# Patient Record
Sex: FEMALE | Race: WHITE
[De-identification: names, ages, dates, MRNs, and addresses within clinical notes are randomized per-mention and may not be internally consistent; named-entity substitution may affect disease eponyms.]

---

## 2018-09-20 ENCOUNTER — HOSPITAL ENCOUNTER (EMERGENCY)
Dept: HOSPITAL 74 - JER | Age: 43
Discharge: HOME | End: 2018-09-20
Payer: COMMERCIAL

## 2018-09-20 VITALS — SYSTOLIC BLOOD PRESSURE: 150 MMHG | TEMPERATURE: 98.6 F | DIASTOLIC BLOOD PRESSURE: 92 MMHG | HEART RATE: 90 BPM

## 2018-09-20 VITALS — BODY MASS INDEX: 25.8 KG/M2
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
## 2018-09-20 DIAGNOSIS — I10: ICD-10-CM
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
## 2018-09-20 DIAGNOSIS — S49.91XA: Primary | ICD-10-CM

## 2018-09-20 PROCEDURE — 3E0333Z INTRODUCTION OF ANTI-INFLAMMATORY INTO PERIPHERAL VEIN, PERCUTANEOUS APPROACH: ICD-10-PCS

## 2018-09-20 NOTE — PDOC
History of Present Illness





<Ila Bailon - Last Filed: 09/20/18 10:38>





- General


History Source: Patient


Exam Limitations: No Limitations





- History of Present Illness


Initial Comments: 





09/20/18 09:35


CHIEF COMPLAINT: Shoulder pain





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 On Saturday, she reports that "50 

pounds" of trays fell onto her right shoulder. She has had severe pain and 

limited range of motion since then, although she has continued to work. Her 

pain is unrelieved by Advil, Tylenol, and ice.





Vital signs on arrival are notable for HTN: /92.





PCP is Dr. Diaz.





REVIEW OF SYSTEMS:


GENERAL/CONSTITUTIONAL: No fever or chills. No weakness. No weight change.


MUSCULOSKELETAL: See HPI.


SKIN: No rash or easy bruising.


NEUROLOGIC: No headache, vertigo, loss of consciousness, or loss of sensation.


HEMATOLOGIC/LYMPHATIC: No anemia, easy bleeding, or history of blood clots.


ALLERGIC/IMMUNOLOGIC: No hives or skin allergy. No latex allergy.





PHYSICAL EXAM:


GENERAL: The patient is awake, alert, and fully oriented, in no acute distress.


LUNGS: Clear to auscultation bilaterally. Normal excursion. No respiratory 

distress or use of accessory muscles.


CV: RRR, S1/S2, no MRG. Cap refill < 2 sec.


ABDOMEN: Soft, non-distended, non-tender.


EXTREMITIES: Holding right arm. Pain with elevation or abduction of right arm 

limiting ROM. Right scapular tenderness.


NEUROLOGICAL: Normal speech, normal gait. CN II-XII grossly intact.


PSYCH: Normal mood, normal affect.


SKIN: Warm, dry, normal turgor, no rashes or lesions noted.








<Consuelo Tanner - Last Filed: 09/20/18 10:47>





- General


Chief Complaint: Pain, Acute


Stated Complaint: RIGHT ARM PAIN


Time Seen by Provider: 09/20/18 07:22





Past History





<Ila Bailon - Last Filed: 09/20/18 10:38>





- Past Medical History


Anemia: No


Asthma: No


Cancer: No


Cardiac Disorders: No


CVA: No


COPD: No


CHF: No


Dementia: No


Diabetes: No


GI Disorders: No


 Disorders: No


HTN: No


Hypercholesterolemia: No


Kidney Stones: Yes


Liver Disease: No


Seizures: No


Thyroid Disease: No





- Surgical History


Abdominal Surgery: No


Appendectomy: No


Cardiac Surgery: No


Cholecystectomy: Yes


Lung Surgery: No


Neurologic Surgery: No


Orthopedic Surgery: No





- Immunization History


Immunization Up to Date: Yes





- Suicide/Smoking/Psychosocial Hx


Smoking Status: Yes


Smoking History: Current every day smoker


Have you smoked in the past 12 months: Yes


Number of Cigarettes Smoked Daily: 3


Information on smoking cessation initiated: No


'Breaking Loose' booklet given: 06/16/14


Hx Alcohol Use: No


Drug/Substance Use Hx: No


Substance Use Type: None


Hx Substance Use Treatment: No





<Consuelo Tanner - Last Filed: 09/20/18 10:47>





- Past Medical History


Allergies/Adverse Reactions: 


 Allergies











Allergy/AdvReac Type Severity Reaction Status Date / Time


 


No Known Drug Allergies Allergy   Verified 09/20/18 06:49











Home Medications: 


Ambulatory Orders





Tramadol HCl/Acetaminophen [Ultracet Tablet] 1 each PO Q6H PRN #20 tablet MDD 4 

09/20/18 











*Physical Exam





- Vital Signs


 Last Vital Signs











Temp Pulse Resp BP Pulse Ox


 


 98.6 F   90   20   150/92   99 


 


 09/20/18 06:25  09/20/18 06:25  09/20/18 06:25  09/20/18 06:25  09/20/18 06:25














<Ila Bailon - Last Filed: 09/20/18 10:38>





- Vital Signs


 Last Vital Signs











Temp Pulse Resp BP Pulse Ox


 


 98.6 F   90   20   150/92   99 


 


 09/20/18 06:25  09/20/18 06:25  09/20/18 06:25  09/20/18 06:25  09/20/18 06:25














<Consuelo Tanner - Last Filed: 09/20/18 10:47>





ED Treatment Course





- Medications


Given in the ED: 


ED Medications














Discontinued Medications














Generic Name Dose Route Start Last Admin





  Trade Name Freq  PRN Reason Stop Dose Admin


 


Ketorolac Tromethamine  30 mg  09/20/18 08:18  09/20/18 08:26





  Toradol Injection -  IM  09/20/18 08:19  30 mg





  ONCE ONE   Administration





     





     





     





     


 


Oxycodone/Acetaminophen  1 combo  09/20/18 10:00  09/20/18 10:03





  Percocet 5/325 -  PO  09/20/18 10:01  1 combo





  ONCE ONE   Administration





     





     





     





     














<Ila Bailon - Last Filed: 09/20/18 10:38>





Medical Decision Making





- Medical Decision Making


The patient was seen and evaluated in conjunction with midlevel provider under 

my direct supervision, ancillary studies were reviewed.  I agree with the plan 

as outlined by NP Flores


43 YOF with right shoulder pain, s/p trays falling against her shoulder


NVI. vitals with  mild HTN, otherwise f/u primary doctor as it needs recheck


XR shoulder with normal alignment, AC joint intact, no clavicle or prox humerus 

fx


contusion/sprain likely, sling for comfort, ROM exercises. OTC pain control as 

needed.


encourage motion and phys activity. 


DC in stable condition. PCP followup 





09/20/18 10:38








<AdamarisAmanIla Preetabdirahman - Last Filed: 09/20/18 10:38>





- Medical Decision Making





09/20/18 09:45


A/P: 43-year-old female with right shoulder injury.





-Xray shoulder 3 views r/o fx, dislocation


-Toradol 30mg IM for pain





Patient reports pain "tolerable" after Toradol.





<Consuelo Tanner - Last Filed: 09/20/18 10:47>





*DC/Admit/Observation/Transfer





<BailonAmanIlakaylen Pendleton - Last Filed: 09/20/18 10:38>





- Discharge Dispostion


Decision to Admit order: No





<Consuelo Tanner - Last Filed: 09/20/18 10:47>


Diagnosis at time of Disposition: 


Shoulder injury


Qualifiers:


 Encounter type: initial encounter Laterality: right Qualified Code(s): 

S49.91XA - Unspecified injury of right shoulder and upper arm, initial encounter








- Discharge Dispostion


Disposition: HOME


Condition at time of disposition: Stable





- Prescriptions


Prescriptions: 


Tramadol HCl/Acetaminophen [Ultracet Tablet] 1 each PO Q6H PRN #20 tablet MDD 4


 PRN Reason: Severe Pain





- Referrals


Referrals: 


Priya Dawson MD [Primary Care Provider] - 


Sean Aragon MD [Staff Physician] - 14 days (Orthopedics - if not improving)





- Patient Instructions


Printed Discharge Instructions:  How to Use a Sling, DI for Shoulder Sprain


Additional Instructions: 


-Use the sling provided, but be sure to take your arm out of the sling and 

stretch gently every day to prevent frozen shoulder


-Take ibuprofen 600mg (3 tablets) every 6 hours with food for no more than one 

week


-Take Tramadol as prescribed for severe breakthrough pain


-Follow up with orthopedics if not improving (referral enclosed)


-Follow up with your primary care doctor to re-check your blood pressure, which 

was elevated today


-Return here for uncontrolled pain or any other concerning symptoms





- Post Discharge Activity


Forms/Work/School Notes:  Back to Work

## 2019-10-14 ENCOUNTER — HOSPITAL ENCOUNTER (EMERGENCY)
Dept: HOSPITAL 74 - JER | Age: 44
LOS: 1 days | Discharge: HOME | End: 2019-10-15
Payer: COMMERCIAL

## 2019-10-14 VITALS — BODY MASS INDEX: 29.8 KG/M2

## 2019-10-14 VITALS — TEMPERATURE: 98.2 F

## 2019-10-14 DIAGNOSIS — J98.01: Primary | ICD-10-CM

## 2019-10-14 DIAGNOSIS — Z90.49: ICD-10-CM

## 2019-10-14 DIAGNOSIS — F41.9: ICD-10-CM

## 2019-10-14 DIAGNOSIS — Z90.79: ICD-10-CM

## 2019-10-14 DIAGNOSIS — J06.9: ICD-10-CM

## 2019-10-14 DIAGNOSIS — Z87.442: ICD-10-CM

## 2019-10-14 DIAGNOSIS — Z88.6: ICD-10-CM

## 2019-10-14 LAB
BASOPHILS # BLD: 0.5 % (ref 0–2)
DEPRECATED RDW RBC AUTO: 13 % (ref 11.6–15.6)
EOSINOPHIL # BLD: 3.9 % (ref 0–4.5)
HCT VFR BLD CALC: 45.1 % (ref 32.4–45.2)
HGB BLD-MCNC: 15.6 GM/DL (ref 10.7–15.3)
INR BLD: 0.95 (ref 0.83–1.09)
LYMPHOCYTES # BLD: 31.2 % (ref 8–40)
MCH RBC QN AUTO: 32.1 PG (ref 25.7–33.7)
MCHC RBC AUTO-ENTMCNC: 34.5 G/DL (ref 32–36)
MCV RBC: 93 FL (ref 80–96)
MONOCYTES # BLD AUTO: 6.2 % (ref 3.8–10.2)
NEUTROPHILS # BLD: 58.2 % (ref 42.8–82.8)
PLATELET # BLD AUTO: 351 K/MM3 (ref 134–434)
PMV BLD: 8.1 FL (ref 7.5–11.1)
PT PNL PPP: 11.2 SEC (ref 9.7–13)
RBC # BLD AUTO: 4.85 M/MM3 (ref 3.6–5.2)
WBC # BLD AUTO: 10.1 K/MM3 (ref 4–10)

## 2019-10-14 PROCEDURE — 3E0F7GC INTRODUCTION OF OTHER THERAPEUTIC SUBSTANCE INTO RESPIRATORY TRACT, VIA NATURAL OR ARTIFICIAL OPENING: ICD-10-PCS

## 2019-10-14 PROCEDURE — 3E033NZ INTRODUCTION OF ANALGESICS, HYPNOTICS, SEDATIVES INTO PERIPHERAL VEIN, PERCUTANEOUS APPROACH: ICD-10-PCS

## 2019-10-14 NOTE — PDOC
History of Present Illness





- General


Chief Complaint: Shortness of Breath


Stated Complaint: COUGH/SOB


Time Seen by Provider: 10/14/19 23:16





- History of Present Illness


Initial Comments: 


10/14/19 23:48


45 yo F PMH hysterectomy, anxiety, cholecystectomy, p/w CP and SOB. States that 

she received the flu shot about a week ago, and then was bedbound for 4 days 

with generalized malaise, sinus congestion, and non-productive cough. She was 

out eating dinner and then dancing with her daughter when she developed R sided 

chest pain, similar in quality to intermittent CP she had been experiencing for 

months but much more intense, "feels like my ribs are broken and it is stabbing 

my lung", associated with severe cough and nausea without vomiting. Patient 

also complains that her lower extremities have been slightly swollen over the 

last week, R worse than L, and has also been having numbness and tingling in 

her toes.





She presents pulse ox of 96% on room air, tachycardic at 125 bpm, /86.








Past History





- Past Medical History


Allergies/Adverse Reactions: 


 Allergies











Allergy/AdvReac Type Severity Reaction Status Date / Time


 


aspirin Allergy Severe Difficulty Verified 10/15/19 00:54





   Breathing  











Home Medications: 


Ambulatory Orders





Tramadol HCl/Acetaminophen [Ultracet Tablet] 1 each PO Q6H PRN #20 tablet MDD 4 

09/20/18 








Anemia: No


Asthma: No


Cancer: No


Cardiac Disorders: No


CVA: No


COPD: No


CHF: No


Dementia: No


Diabetes: No


GI Disorders: No


 Disorders: No


HTN: No


Hypercholesterolemia: No


Kidney Stones: Yes


Liver Disease: No


Seizures: No


Thyroid Disease: No





- Surgical History


Abdominal Surgery: No


Appendectomy: No


Cardiac Surgery: No


Cholecystectomy: Yes


Lung Surgery: No


Neurologic Surgery: No


Orthopedic Surgery: No





- Immunization History


Immunization Up to Date: Yes





- Psycho Social/Smoking Cessation Hx


Smoking Status: Yes


Smoking History: Current every day smoker


Have you smoked in the past 12 months: Yes


Number of Cigarettes Smoked Daily: 3


Information on smoking cessation initiated: No


'Breaking Loose' booklet given: 06/16/14


Hx Alcohol Use: Yes


Drug/Substance Use Hx: No


Substance Use Type: None


Hx Substance Use Treatment: No





**Review of Systems





- Review of Systems


Constitutional: Yes: Malaise.  No: Chills, Diaphoresis, Fever


HEENTM: Yes: Nose Congestion.  No: Recent change in vision, Double Vision, 

Throat Pain, Difficulty Swallowing


Respiratory: Yes: Cough, Shortness of Breath.  No: Wheezing


Cardiac (ROS): Yes: Chest Pain (R sided).  No: Edema, Irregular Heart Rate


ABD/GI: Yes: Nausea.  No: Constipated, Diarrhea, Vomiting


: No: Burning, Dysuria, Discharge, Frequency, Flank Pain


Musculoskeletal: No: Back Pain, Muscle Weakness


Neurological: No: Headache, Numbness, Tingling





*Physical Exam





- Vital Signs


 Last Vital Signs











Temp Pulse Resp BP Pulse Ox


 


 98.2 F   132 H  29 H  218/188 H  100 


 


 10/14/19 23:02  10/14/19 23:02  10/14/19 23:02  10/14/19 23:02  10/14/19 23:02














- Physical Exam


Comments: 


10/15/19 00:51


Gen: well-developed, well-nourished, appears distressed, coughing extremely hard


Neuro: AAOX4, CN II-XII intact, FTN intact, EOMI, PERRLA


HEENT: atraumatic, normocephalic, dry mucous membranes


Neck: trachea midline, supple


CV: tachycardic, regular rhythm, no murmurs, rubs, or gallops


Pulm: tachypneic, CTA b/l, no wheezing


Abd: soft, non-distended, non-tender


MSK: full ROM, intact pulses


Extr: no edema, no deformities


Skin: warm, dry








ED Treatment Course





- LABORATORY


CBC & Chemistry Diagram: 


 10/14/19 23:30





 10/14/19 23:30





- ADDITIONAL ORDERS


Additional order review: 


 











  10/14/19





  23:30


 


RBC  4.85


 


MCV  93.0


 


MCHC  34.5


 


RDW  13.0


 


MPV  8.1


 


Neutrophils %  58.2


 


Lymphocytes %  31.2  D


 


Monocytes %  6.2


 


Eosinophils %  3.9


 


Basophils %  0.5














Medical Decision Making





- Medical Decision Making


10/15/19 00:47


Concern for potential PE vs anxiety attack.


- CBC, CMP, mag


- CXR port, trop


- EKG sinus tachycardia at 118bpm


- UA/UC


- BNP


- coags


- lipase


- CTA chest for r/o PE


- reassess





10/15/19 02:11


CTA negative for PE. Will give 0.5 mg Xanax, reassess.





10/15/19 02:51


Patient much improved, likely had a panic attack. Discussed getting in touch 

with her primary care doctor and a psychiatrist as soon as possible. Will dc 

home.








Discharge





- Discharge Information


Problems reviewed: Yes


Clinical Impression/Diagnosis: 


 Anxiety





Condition: Improved


Disposition: HOME





- Follow up/Referral


Referrals: 


Wei Diaz MD [Primary Care Provider] - 





- Patient Discharge Instructions


Patient Printed Discharge Instructions:  DI for Anxiety -- Adult


Additional Instructions: 


You were seen with chest pain and shortness of breath. Your EKG and labs were 

unremarkable, and your CTA of your chest did not show a clot in your lungs. 

Your symptoms responded after receiving Xanax. You most likely had a panic 

attack. It is very important that you follow up with your primary care 

physician. Make an appointment within 1 week, and make sure they can get you in 

touch with a psychiatrist. Return to the ED if you develop worsening symptoms.





- Post Discharge Activity


Work/Back to School Note:  Back to Work

## 2019-10-15 ENCOUNTER — HOSPITAL ENCOUNTER (EMERGENCY)
Dept: HOSPITAL 74 - JER | Age: 44
Discharge: HOME | End: 2019-10-15
Payer: COMMERCIAL

## 2019-10-15 VITALS — HEART RATE: 96 BPM | SYSTOLIC BLOOD PRESSURE: 131 MMHG | DIASTOLIC BLOOD PRESSURE: 84 MMHG

## 2019-10-15 VITALS — SYSTOLIC BLOOD PRESSURE: 107 MMHG | HEART RATE: 78 BPM | DIASTOLIC BLOOD PRESSURE: 72 MMHG

## 2019-10-15 VITALS — TEMPERATURE: 98.7 F

## 2019-10-15 VITALS — BODY MASS INDEX: 27.4 KG/M2

## 2019-10-15 DIAGNOSIS — Z88.6: ICD-10-CM

## 2019-10-15 DIAGNOSIS — R07.89: Primary | ICD-10-CM

## 2019-10-15 DIAGNOSIS — Z90.79: ICD-10-CM

## 2019-10-15 DIAGNOSIS — F41.9: ICD-10-CM

## 2019-10-15 DIAGNOSIS — Z90.49: ICD-10-CM

## 2019-10-15 LAB
ALBUMIN SERPL-MCNC: 3.7 G/DL (ref 3.4–5)
ALBUMIN SERPL-MCNC: 4 G/DL (ref 3.4–5)
ALP SERPL-CCNC: 74 U/L (ref 45–117)
ALP SERPL-CCNC: 88 U/L (ref 45–117)
ALT SERPL-CCNC: 16 U/L (ref 13–61)
ALT SERPL-CCNC: 17 U/L (ref 13–61)
ANION GAP SERPL CALC-SCNC: 3 MMOL/L (ref 8–16)
ANION GAP SERPL CALC-SCNC: 9 MMOL/L (ref 8–16)
APPEARANCE UR: CLEAR
AST SERPL-CCNC: 27 U/L (ref 15–37)
AST SERPL-CCNC: 40 U/L (ref 15–37)
BASOPHILS # BLD: 1.2 % (ref 0–2)
BILIRUB SERPL-MCNC: 0.3 MG/DL (ref 0.2–1)
BILIRUB SERPL-MCNC: 0.6 MG/DL (ref 0.2–1)
BILIRUB UR STRIP.AUTO-MCNC: NEGATIVE MG/DL
BNP SERPL-MCNC: 26.2 PG/ML (ref 5–125)
BUN SERPL-MCNC: 10.4 MG/DL (ref 7–18)
BUN SERPL-MCNC: 13.2 MG/DL (ref 7–18)
CALCIUM SERPL-MCNC: 8.5 MG/DL (ref 8.5–10.1)
CALCIUM SERPL-MCNC: 8.8 MG/DL (ref 8.5–10.1)
CHLORIDE SERPL-SCNC: 108 MMOL/L (ref 98–107)
CHLORIDE SERPL-SCNC: 109 MMOL/L (ref 98–107)
CO2 SERPL-SCNC: 23 MMOL/L (ref 21–32)
CO2 SERPL-SCNC: 26 MMOL/L (ref 21–32)
COLOR UR: YELLOW
CREAT SERPL-MCNC: 0.6 MG/DL (ref 0.55–1.3)
CREAT SERPL-MCNC: 0.8 MG/DL (ref 0.55–1.3)
DEPRECATED RDW RBC AUTO: 13.2 % (ref 11.6–15.6)
EOSINOPHIL # BLD: 2.8 % (ref 0–4.5)
GLUCOSE SERPL-MCNC: 102 MG/DL (ref 74–106)
GLUCOSE SERPL-MCNC: 81 MG/DL (ref 74–106)
HCT VFR BLD CALC: 43.4 % (ref 32.4–45.2)
HGB BLD-MCNC: 14.8 GM/DL (ref 10.7–15.3)
INR BLD: 1.08 (ref 0.83–1.09)
KETONES UR QL STRIP: NEGATIVE
LEUKOCYTE ESTERASE UR QL STRIP.AUTO: NEGATIVE
LIPASE SERPL-CCNC: 164 U/L (ref 73–393)
LYMPHOCYTES # BLD: 18.6 % (ref 8–40)
MAGNESIUM SERPL-MCNC: 2 MG/DL (ref 1.8–2.4)
MAGNESIUM SERPL-MCNC: 2 MG/DL (ref 1.8–2.4)
MCH RBC QN AUTO: 31.6 PG (ref 25.7–33.7)
MCHC RBC AUTO-ENTMCNC: 34.1 G/DL (ref 32–36)
MCV RBC: 92.9 FL (ref 80–96)
MONOCYTES # BLD AUTO: 6.3 % (ref 3.8–10.2)
NEUTROPHILS # BLD: 71.1 % (ref 42.8–82.8)
NITRITE UR QL STRIP: NEGATIVE
PH UR: 6 [PH] (ref 5–8)
PLATELET # BLD AUTO: 302 K/MM3 (ref 134–434)
PMV BLD: 8.1 FL (ref 7.5–11.1)
POTASSIUM SERPLBLD-SCNC: 4.4 MMOL/L (ref 3.5–5.1)
POTASSIUM SERPLBLD-SCNC: 5 MMOL/L (ref 3.5–5.1)
PROT SERPL-MCNC: 6.7 G/DL (ref 6.4–8.2)
PROT SERPL-MCNC: 7.3 G/DL (ref 6.4–8.2)
PROT UR QL STRIP: NEGATIVE
PROT UR QL STRIP: NEGATIVE
PT PNL PPP: 12.7 SEC (ref 9.7–13)
RBC # BLD AUTO: 4.67 M/MM3 (ref 3.6–5.2)
SODIUM SERPL-SCNC: 138 MMOL/L (ref 136–145)
SODIUM SERPL-SCNC: 140 MMOL/L (ref 136–145)
SP GR UR: 1 (ref 1.01–1.03)
UROBILINOGEN UR STRIP-MCNC: 0.2 MG/DL (ref 0.2–1)
WBC # BLD AUTO: 12.3 K/MM3 (ref 4–10)

## 2019-10-15 PROCEDURE — 3E0337Z INTRODUCTION OF ELECTROLYTIC AND WATER BALANCE SUBSTANCE INTO PERIPHERAL VEIN, PERCUTANEOUS APPROACH: ICD-10-PCS

## 2019-10-15 PROCEDURE — 3E0333Z INTRODUCTION OF ANTI-INFLAMMATORY INTO PERIPHERAL VEIN, PERCUTANEOUS APPROACH: ICD-10-PCS

## 2019-10-15 NOTE — EKG
Test Reason : 

Blood Pressure : ***/*** mmHG

Vent. Rate : 113 BPM     Atrial Rate : 113 BPM

   P-R Int : 148 ms          QRS Dur : 074 ms

    QT Int : 328 ms       P-R-T Axes : 067 050 054 degrees

   QTc Int : 449 ms

 

*** POOR DATA QUALITY, INTERPRETATION MAY BE ADVERSELY AFFECTED

SINUS TACHYCARDIA

POSSIBLE LEFT ATRIAL ENLARGEMENT

BORDERLINE ECG

WHEN COMPARED WITH ECG OF 16-JUN-2014 04:58,

VENT. RATE HAS INCREASED BY  58 BPM

Confirmed by Eliud Pride MD ([id number]) on 10/15/2019 12:51:27 PM

 

Referred By:             Confirmed By:Eliud Pride MD

## 2019-10-15 NOTE — PDOC
Attending Attestation





- Resident


Resident Name: JettmichelleDanilo





- ED Attending Attestation


I have performed the following: I have examined & evaluated the patient, The 

case was reviewed & discussed with the resident, I agree w/resident's findings 

& plan, Exceptions are as noted





- HPI


HPI: 





10/15/19 10:10


45yo F hx cholecystectomy, hysterectomy, anxiety p/w 2 weeks of right lower 

chest pain, worse with movement, inspiration, palpation. Pain has been much 

worse since last night after she was dancing with her daughter. Pt was seen 

here for the same, had a PE w/u including CTA which was negative. Pt reports 

the sxs started in the setting of a cough that began 1.5 weeks ago. Denies 

associated dizziness, diaphoresis, weakness/numbness, N/V, SOB, abd pain, LE 

edema. She has been taking advil at home with minimal relief. Pt returned to 

the ED this morning as she was unable to sleep 2/2 pain. Was given xanax and 

morphine prior to discharge last night. Denies other trauma.





- Physicial Exam


PE: 





10/15/19 10:40


GENERAL: Awake, alert, and fully oriented, in no acute distress


EYES: PERRLA, EOMI, sclera anicteric, conjunctiva clear


ENT: Oropharynx clear without exudates. Moist mucosa


NECK: Normal ROM, supple, no lymphadenopathy, JVD, or masses


LUNGS: Breath sounds equal, clear to auscultation bilaterally.  No wheezes, and 

no crackles


HEART: Regular rate and rhythm, normal S1 and S2, no murmurs, rubs or gallops. +

ttp along 9th/10th intercostal muscles. No masses palpated around or on breast.


ABDOMEN: Soft, nontender, normoactive bowel sounds.  No guarding, no rebound.  

No masses


EXTREMITIES: Normal range of motion, no edema. No cords, erythema, or tenderness


NEUROLOGICAL:  Normal speech, cranial nerves intact, equal stregnth and 

sensation b/l


SKIN: Warm, Dry, normal turgor, no rashes or lesions noted.





- Medical Decision Making





10/15/19 10:44


45yo F presents to the ED for 2nd time in 1 day for reproducible, positional 

rib pain, started in setting of coughing 2 weeks ago, worse after dancing last 

night. 


Highly consistent with musculoskeletal pain, rib strain, especially with 

negative CTA and trop yesterday


EKG with no significant changes


WIll rpt trop, CXR, and treat pain with toradol. Pt states allergy to ASA is 

throat closing but has been taking advil almost daily





10/15/19 12:26


Labs wnl


Patient is feeling significantly better after Toradol, was able to sleep for 2 

hours in the emergency department.  She is eager to go home.  Will prescribe 

naproxen for pain control.  Discussed with patient the importance of not taking 

Advil, Motrin, ibuprofen or any other NSAID medications while she is taking 

naproxen.  She is clinically stable for discharge home.





I discussed the physical exam findings, ancillary test results and final 

diagnoses with the patient. I answered all of the patient's questions. The 

patient was satisfied with the care received and felt comfortable with the 

discharge plan and treatment plan. The patient will call their primary care 

physician within 24 hours to arrange follow-up and will return to the Emergency 

Department with any new, persistent or worsening symptoms. 





**Heart Score/ECG Review


  ** #1





10/15/19 10:46


Twelve-lead EKG was performed and reviewed by me.  Normal sinus rhythm, rate 

77.  Normal axis and intervals.  No ST elevations.  T wave inversions in 3 and 

V3.  When compared to EKG done last night T wave inversion in V3 it is, however 

this could be due to lead placement.  In isolation is not concerning.

## 2019-10-15 NOTE — PDOC
Documentation entered by Haley Ortega SCRIBE, acting as scribe for Giselle Carrasco MD.








Giselle Carrasco MD:  This documentation has been prepared by the harrisibe, 

Haley Ortega SCRIBE, under my direction and personally reviewed by me in its 

entirety.  I confirm that the documentation accurately reflects all work, 

treatment, procedures, and medical decision making performed by me.  





Attending Attestation





- Resident


Resident Name: Irene Moreno





- ED Attending Attestation


I have performed the following: I have examined & evaluated the patient, The 

case was reviewed & discussed with the resident, I agree w/resident's findings 

& plan, Exceptions are as noted





- HPI


HPI: 





10/14/19 23:21


44-year-old female presents with chest pain and shortness of breath.  She says 

she got the flu shot and then was bedbound for 4 days with cough and shortness 

of breath.  She presents pulse ox of 96% on room air, tachycardic at 125 bpm


She also states that her lower extremities have been slightly swollen over the 

last week





- Physicial Exam


PE: 





10/14/19 23:37


44-year-old female presents with tachycardia and cough


Head normocephalic atraumatic


Neck supple, no JVD no bruits


Lungs clear to auscultation bilaterally


CVS sinus tachycardia


Abdomen protuberant, nontender


Skin warm and dry


Extremities no pitting edema


Neuro alert and oriented x3 patient is ambulating to the bathroom currently





- Medical Decision Making





10/15/19 01:02


43 yo female  who reports 5-6 days of cough and shortness of breath but she is 

not hypoxic





ekg sinus tachycardia 


10/15/19 02:13


Repeat blood pressure was 125/86


CT of the chest was negative for pulmonary embolism negative for any infiltrates

, the lungs are clear





Her troponin was negative, her CBC was unremarkable


Chemistries show normal renal function, normal electrolytes and normal LFTs


10/15/19 02:15





10/15/19 02:16


Patient was having severe coughing spasms that have subsided


Impression bronchospasm, URI

## 2019-10-15 NOTE — PDOC
History of Present Illness





- General


Chief Complaint: Pain


Stated Complaint: COUGH/ SOB


Time Seen by Provider: 10/15/19 09:18


History Source: Patient


Exam Limitations: No Limitations





- History of Present Illness


Initial Comments: 





10/15/19 09:45


Patient is a 44F with history of hysterectomy, anxiety, cholecystectomy here 

today complaining of pain to her right lower chest that onset yesterday after 

dancing with her daughter at a restaurant. Patient states this pain is worse 

with inspiration and coughing. She states that she's been coughing with 

shortness of breath for the past week after getting a flu shot. Endorses 

associated subjective fevers and chills. Denies nausea and vomiting. Denies 

headache, neck pain. Denies abdominal pain, dysuria. Endorses tingling in her 

hands and feet.





Patient was evaluated in the ED yesterday for the same complaint. EKG, CTA, CXR

, Labs were all reassuring. Patient was diagnosed with anxiety after symptoms 

improved after getting xanax. 





Past History





- Past Medical History


Allergies/Adverse Reactions: 


 Allergies











Allergy/AdvReac Type Severity Reaction Status Date / Time


 


aspirin Allergy Severe Difficulty Verified 10/15/19 08:40





   Breathing  











Home Medications: 


Ambulatory Orders





Tramadol HCl/Acetaminophen [Ultracet Tablet] 1 each PO Q6H PRN #20 tablet MDD 4 

09/20/18 








Anemia: No


Asthma: No


Cancer: No


Cardiac Disorders: No


CVA: No


COPD: No


CHF: No


Dementia: No


Diabetes: No


GI Disorders: No


 Disorders: No


HTN: No


Hypercholesterolemia: No


Kidney Stones: Yes


Liver Disease: No


Seizures: No


Thyroid Disease: No





- Surgical History


Abdominal Surgery: No


Appendectomy: No


Cardiac Surgery: No


Cholecystectomy: Yes


Lung Surgery: No


Neurologic Surgery: No


Orthopedic Surgery: No





- Immunization History


Immunization Up to Date: Yes





- Psycho Social/Smoking Cessation Hx


Smoking Status: Yes


Smoking History: Current every day smoker


Have you smoked in the past 12 months: Yes


Number of Cigarettes Smoked Daily: 3


Information on smoking cessation initiated: No


'Breaking Loose' booklet given: 06/16/14


Hx Alcohol Use: No


Drug/Substance Use Hx: No


Substance Use Type: None


Hx Substance Use Treatment: No





**Review of Systems





- Review of Systems


Able to Perform ROS?: Yes


Comments:: 





10/15/19 09:56


GENERAL/CONSTITUTIONAL: + fever +chills. No weakness.


HEAD, EYES, EARS, NOSE AND THROAT: No change in vision. No sore throat.


CARDIOVASCULAR: +chest pain +shortness of breath


RESPIRATORY: No cough, wheezing, or hemoptysis.


GASTROINTESTINAL: No nausea, vomiting, diarrhea or constipation.


GENITOURINARY: No dysuria, frequency, or change in urination.


MUSCULOSKELETAL: No joint or muscle swelling or pain. No neck or back pain.


SKIN: No rash


NEUROLOGIC: No headache, vertigo, loss of consciousness, or change in strength/

sensation.


HEMATOLOGIC/LYMPHATIC: No anemia, easy bleeding, or history of blood clots.


ALLERGIC/IMMUNOLOGIC: No hives or skin allergy.











*Physical Exam





- Vital Signs


 Last Vital Signs











Temp Pulse Resp BP Pulse Ox


 


 98.7 F   103 H  26 H  116/77   98 


 


 10/15/19 08:41  10/15/19 08:41  10/15/19 08:41  10/15/19 08:41  10/15/19 08:41














- Physical Exam


Comments: 





10/15/19 09:57


GENERAL: Awake, alert, and fully oriented


CHEST: Tender to palpation along lower ribs on right side, no visible bruising


HEAD: No signs of trauma, normocephalic, atraumatic


EYES: PERRLA, EOMI, sclera anicteric, conjunctiva clear


ENT: Auricles normal inspection, hearing grossly normal, nares patent, 

oropharynx clear without


exudates. Moist mucosa


NECK: Normal ROM, supple, no lymphadenopathy, JVD, or masses


LUNGS: No distress, speaks full sentences, clear to auscultation bilaterally


HEART: Regular rate and rhythm, normal S1 and S2, no murmurs, rubs or gallops, 

peripheral pulses normal and equal bilaterally.


ABDOMEN: Soft, nontender, normoactive bowel sounds.  No guarding, no rebound.  

No masses


EXTREMITIES: Normal inspection, Normal range of motion, no edema.  No clubbing 

or cyanosis.


NEUROLOGICAL: Cranial nerves II through XII grossly intact.  Normal speech, 

normal gait, no focal sensorimotor deficits


SKIN: Warm, Dry, normal turgor, no rashes or lesions noted.








ED Treatment Course





- LABORATORY


CBC & Chemistry Diagram: 


 10/15/19 10:40





 10/15/19 10:40





- RADIOLOGY


Radiology Studies Ordered: 














 Category Date Time Status


 


 CHEST PA & LAT [RAD] Stat Radiology  10/15/19 09:30 Ordered














Medical Decision Making





- Medical Decision Making





10/15/19 09:58


Patient is 44F with history of anxiety, cholecystectomy, hysterectomy here 

today with chest pain. Vitals notable for tachycardia and tachypnea. Lungs 

clear. Patient had full work up last night, ruled out PE. Will re-evaluate with 

cxr, cardiac labs, ekg. Will treat with toradol.


10/15/19 10:47


EKG shows nsr with no st elevation/depression. Normal axis. Normal intervals. 

One t wave inversion isolated to V3.


10/15/19 12:14


CBC normal


CMP normal


Troponin negative.


CXR normal.





Patient reassessed, pain has improved. Patient states that she's ready to go 

home. 





Discharge





- Discharge Information


Problems reviewed: Yes


Clinical Impression/Diagnosis: 


 Chest pain





Condition: Good


Disposition: HOME





- Admission


No





- Follow up/Referral


Referrals: 


Priya Dawson MD [Primary Care Provider] - 





- Patient Discharge Instructions


Patient Printed Discharge Instructions:  DI for Atypical Chest Pain


Additional Instructions: 


Please follow up with your primary care doctor in the next few days.





Please take the naproxen as directed for pain. Your next dose will be due 

tonight. Please do not take motrin or ibuprofen if you are taking napoxen.





Please return to the ED if you have any new, worsening or concerning symptoms, 

especially fever, increasing pain and shortness of breath.





- Post Discharge Activity


Work/Back to School Note:  Back to Work

## 2019-10-16 NOTE — EKG
Test Reason : 

Blood Pressure : ***/*** mmHG

Vent. Rate : 081 BPM     Atrial Rate : 081 BPM

   P-R Int : 148 ms          QRS Dur : 084 ms

    QT Int : 402 ms       P-R-T Axes : 005 019 008 degrees

   QTc Int : 466 ms

 

NORMAL SINUS RHYTHM

NORMAL ECG

WHEN COMPARED WITH ECG OF 14-OCT-2019 23:00,

NO SIGNIFICANT CHANGE WAS FOUND

Confirmed by ROSEANNE TRAVIS MD ([id number]) on 10/16/2019 11:03:28 AM

 

Referred By:             Confirmed By:ROSEANNE TRAVIS MD

## 2020-03-11 ENCOUNTER — HOSPITAL ENCOUNTER (EMERGENCY)
Dept: HOSPITAL 74 - JER | Age: 45
Discharge: HOME | End: 2020-03-11
Payer: COMMERCIAL

## 2020-03-11 VITALS — SYSTOLIC BLOOD PRESSURE: 151 MMHG | HEART RATE: 80 BPM | TEMPERATURE: 98 F | DIASTOLIC BLOOD PRESSURE: 100 MMHG

## 2020-03-11 VITALS — BODY MASS INDEX: 26.6 KG/M2

## 2020-03-11 DIAGNOSIS — Z88.8: ICD-10-CM

## 2020-03-11 DIAGNOSIS — B02.8: Primary | ICD-10-CM

## 2020-03-11 PROCEDURE — 3E0233Z INTRODUCTION OF ANTI-INFLAMMATORY INTO MUSCLE, PERCUTANEOUS APPROACH: ICD-10-PCS

## 2020-03-11 NOTE — PDOC
History of Present Illness





- General


Chief Complaint: Ear Problem


Stated Complaint: R EAR SWELLING


Time Seen by Provider: 03/11/20 07:31





- History of Present Illness


Initial Comments: 


CHIEF COMPLAINT: R ear/neck pain 





HISTORY OF PRESENT ILLNESS: 45 yo F with no PMH presents to ED with acute onset 

pain to R ear since this morning.  Patient reports hx of "really bad ear 

infections" when she was younger but does not remember the last time she had 

one. She describes the pain as a "pain inside the ear, like it's throbbing or 

tingling" and that the pain radiates down the right side of her face/neck as 

well. 





No recent travel or sick contacts. 





PAST MEDICAL HISTORY: Denies past medical history





FAMILY HISTORY: Denies





SOCIAL HISTORY: Denies tobacco, alcohol, illicit drug use. 





SURGICAL HISTORY: Denies





ALLERGIES: No known drug allergies





REVIEW OF SYSTEMS


General/Constitutional: Denies fever or chills. Denies weakness, weight change.





HEENT: Right ear/facial/neck pain. Denies change in vision. Denies sore throat.





Cardiovascular: Denies chest pain or shortness of breath.





Respiratory: Denies cough, wheezing, or hemoptysis.





Gastrointestinal: Denies nausea, vomiting, diarrhea or constipation. Denies 

rectal bleeding.





Genitourinary: Denies dysuria, frequency, or change in urination.





Musculoskeletal: Denies joint or muscle swelling or pain. Denies neck or back 

pain.





Skin and breasts: Denies rash or easy bruising.





Neurologic: Denies headache, vertigo, loss of consciousness, or loss of 

sensation.





Psychiatric: Denies depression or anxiety.








PHYSICAL EXAM


General Appearance: Well-appearing, appropriately dressed.  No apparent 

distress.





HEENT: Erythema and mild swelling to R ear with TTP to mastoid process and along

C2 dermatome. EOMI, PERRLA, normal ENT inspection, normal voice, TMs normal, 

pharynx normal.  No conjunctival pallor.  No photophobia, scleral icterus.





Neck: Supple.  Trachea midline. No tenderness, rigidity, carotid bruit, stridor,

lymphadenopathy, or thyromegaly. 





Respiratory/Chest: Lungs CTAB.  No shortness of breath, chest tenderness, 

respiratory distress, accessory muscle use. No crackles, rales, rhonchi, 

stridor, wheezing, dullness





Cardiovascular: RRR. S1, S2.  No JVD, murmur, bradycardia, tachycardia.





Vascular Pulses: Dorsalis-Pedis (R): 2+, Dorsalis-Pedis (L): 2+





Gastrointestinal/Abdominal: Normal bowel sounds.  Abdomen soft, non-distended.  

No tenderness or rebound tenderness. No  organomegaly, pulsatile mass, guarding,

hernia, hepatomegaly, splenomegaly.





Lymphatic: No adenopathy, tenderness.





Musculoskeletal/Extremities:  Normal inspection. FROM of all extremities, normal

capillary refill.  Pelvis Stable.  No CVA tenderness. No tenderness to 

extremities, pedal edema, swelling, erythema or deformity.





Integumentary: Appropriate color, dry, warm.  No cyanosis, erythema, jaundice or

rash





Neurologic: CNs II-XII intact. Fully oriented, alert.  Appropriate mood/affect. 

Motor strength 5/5.  No appreciable EOM palsy, facial droop or sensory deficit.





Past History





- Past Medical History


Allergies/Adverse Reactions: 


                                    Allergies











Allergy/AdvReac Type Severity Reaction Status Date / Time


 


aspirin Allergy Severe Difficulty Verified 03/11/20 07:29





   Breathing  


 


naproxen [From Aleve] Allergy   Verified 03/11/20 07:30











Home Medications: 


Ambulatory Orders





Diclofenac Sodium 75 mg PO BID #20 tablet. 03/11/20 


Valacyclovir HCl [Valtrex] 1,000 mg PO TID #21 tablet 03/11/20 








Anemia: No


Asthma: No


Cancer: No


Cardiac Disorders: No


CVA: No


COPD: No


CHF: No


Dementia: No


Diabetes: No


GI Disorders: No


 Disorders: No


HTN: No


Hypercholesterolemia: No


Kidney Stones: Yes


Liver Disease: No


Seizures: No


Thyroid Disease: No





- Surgical History


Abdominal Surgery: No


Appendectomy: No


Cardiac Surgery: No


Cholecystectomy: Yes


Lung Surgery: No


Neurologic Surgery: No


Orthopedic Surgery: No





- Immunization History


Immunization Up to Date: Yes





- Psycho Social/Smoking Cessation Hx


Smoking Status: Yes


Smoking History: Unknown if ever smoked


Have you smoked in the past 12 months: Yes


Number of Cigarettes Smoked Daily: 3


'Breaking Loose' booklet given: 06/16/14


Hx Alcohol Use: No


Drug/Substance Use Hx: No


Substance Use Type: None


Hx Substance Use Treatment: No





*Physical Exam





- Vital Signs


                                Last Vital Signs











Temp Pulse Resp BP Pulse Ox


 


 98 F   80   18   151/100   99 


 


 03/11/20 07:30  03/11/20 07:30  03/11/20 07:30  03/11/20 07:30  03/11/20 07:30














ED Treatment Course





- RADIOLOGY


Radiology Studies Ordered: 














 Category Date Time Status


 


 TEMPORAL BONES CT W/O CONTRAST [CT] Stat CT Scan  03/11/20 07:57 Ordered














Medical Decision Making





- Medical Decision Making





03/11/20 08:09


45 yo F with no PMH presents to ED with acute onset pain to R ear since this 

morning. 





Given significant hx of AOM, concern for mastoiditis vs shingles.  





-temporal bone CT r/o mastoiditis, if negative likely shingles given 

distribution of pain over single facial dermatome.  


03/11/20 10:30


CT negative. 





Patient c/o that pain has become a very heavy burning sensation to her left ear.

  Likely shingles.  





Will dc with Valtrex and pain control. 





Discharge





- Discharge Information


Problems reviewed: Yes


Clinical Impression/Diagnosis: 


Shingles


Qualifiers:


 Herpes zoster complications: unspecified herpes zoster complication Qualified 

Code(s): B02.8 - Zoster with other complications





Condition: Stable


Disposition: HOME





- Admission


No





- Additional Discharge Information


Prescriptions: 


Diclofenac Sodium 75 mg PO BID #20 tablet.


Valacyclovir HCl [Valtrex] 1,000 mg PO TID #21 tablet





- Follow up/Referral


Referrals: 


Priya Dawson MD [Primary Care Provider] - 





- Patient Discharge Instructions





- Post Discharge Activity


Work/Back to School Note:  Back to Work

## 2020-10-09 ENCOUNTER — HOSPITAL ENCOUNTER (EMERGENCY)
Dept: HOSPITAL 74 - JER | Age: 45
Discharge: HOME | End: 2020-10-09
Payer: COMMERCIAL

## 2020-10-09 VITALS — HEART RATE: 86 BPM | SYSTOLIC BLOOD PRESSURE: 140 MMHG | DIASTOLIC BLOOD PRESSURE: 94 MMHG

## 2020-10-09 VITALS — BODY MASS INDEX: 25.9 KG/M2

## 2020-10-09 VITALS — TEMPERATURE: 98.4 F

## 2020-10-09 DIAGNOSIS — R05: Primary | ICD-10-CM

## 2020-10-09 DIAGNOSIS — J34.89: ICD-10-CM

## 2020-10-09 PROCEDURE — C9803 HOPD COVID-19 SPEC COLLECT: HCPCS

## 2020-10-09 PROCEDURE — U0003 INFECTIOUS AGENT DETECTION BY NUCLEIC ACID (DNA OR RNA); SEVERE ACUTE RESPIRATORY SYNDROME CORONAVIRUS 2 (SARS-COV-2) (CORONAVIRUS DISEASE [COVID-19]), AMPLIFIED PROBE TECHNIQUE, MAKING USE OF HIGH THROUGHPUT TECHNOLOGIES AS DESCRIBED BY CMS-2020-01-R: HCPCS

## 2020-10-09 NOTE — PDOC
Attending Attestation





- Resident


Resident Name: GingerMelanie





- ED Attending Attestation


I have performed the following: I have examined & evaluated the patient, The 

case was reviewed & discussed with the resident, I agree w/resident's findings &

plan, Exceptions are as noted





- HPI


HPI: 





10/15/20 19:42


See resident HPI





- Physicial Exam


PE: 





10/15/20 19:42


Well appearning, NAD, AOx3


Oropharynx unremarkable


Neck supple, no tender LAD


Tachycardic on initial interaction


Normal wob, no wheeze, crackles


Abd soft, nt


JOHN, NFD





- Medical Decision Making





10/15/20 19:44


Viral syndrome, MARCELA, URI





hydrate, flu swab


re-eval





dispo per clinical course, likely dc with symptomatic support





Discharge





- Discharge Information


Problems reviewed: Yes


Clinical Impression/Diagnosis: 


 Cough, Rhinorrhea





Condition: Improved


Disposition: HOME





- Follow up/Referral


Referrals: 


Priya Dawson MD [Primary Care Provider] - 





- Patient Discharge Instructions


Patient Printed Discharge Instructions:  [facility]-Coronavirus Instructions, R-

[facility] COVID-19 Isolation Protocol


Additional Instructions: 


You have been seen in the Emergency Department for your body aches, cough, and 

chills. Your flu test is negative. You most likely have a viral syndrome. At 

this time it's most important to stay hydrated,





Take Tylenol as directed on the medication bottle every 6 hours to help with 

your fatigue and body aches, but do not exceed 4g of Tylenol a day. 





Follow-up with your primary care doctor within 1 week.





Return to the Emergency Department immediately if you experience chest pain, 

difficulty breathing, passing out, or any other new or worsening symptom.








- Post Discharge Activity


Work/Back to School Note:  Back to Work

## 2020-10-09 NOTE — XMS
Summarization Of Episode

                           Created on:2020



Patient:ANASTASIIA MCGOVERN

Sex:Female

:1975

External Reference #:[id number]





Demographics







                          Address                   [address]



                                                    [place] [address]

 

                          Home Phone                [phone]

 

                          Work Phone                [phone]

 

                          Email Address             NO EMAIL

 

                          Preferred Language        English

 

                          Marital Status            Not  or 

 

                          Quaker Affiliation     [place]

 

                          Race                      [place]

 

                          Ethnic Group              Not  or 









Author







                          Organization              [facility]









Support







                Name            Relationship    Address         Phone

 

                FRANCISCO MCGOVERN DAUGHTER        [address] [phone]



                                                [place] [address] 

 

                [facility]            Unavailable     [address] [phone]



                                                [place] [address] 

 

                ROBBY BUENROSTRO         [address] [phone]



                                                [place] [address] 









Re-disclosure Warning

The records that you are about to access may contain information from federally-
assisted alcohol or drug abuse programs. If such information is present, then 
the following federally mandated warning applies: This information has been 
disclosed to you from records protected by federal confidentiality rules (42 CFR
part 2). The federal rules prohibit you from making any further disclosure of 
this information unless further disclosure is expressly permitted by the written
consent of the person to whom it pertains or as otherwise permitted by 42 CFR 
part 2. A general authorization for the release of medical or other information 
is NOT sufficient for this purpose. The Federal rules restrict any use of the 
information to criminally investigate or prosecute any alcohol or drug abuse 
patient.The records that you are about to access may contain highly sensitive 
health information, the redisclosure of which is protected by Article 27-F of 
the [facility] Public Health law. If you continue you may haveaccess to 
information: Regarding HIV / AIDS; Provided by facilities licensed or operated 
by the [facility] Office of Mental Health; or Provided by the [facility]
Office for People With Developmental Disabilities. If such information is 
present, then the following [place] mandated warning applies: This 
information has been disclosed to you from confidential records which are 
protected by state law. State law prohibits you from making any further 
disclosure of this information without the specific written consent of the 
person to whom it pertains, or as otherwise permitted by law. Any unauthorized 
further disclosure in violation of state law may result in a fine or longterm 
sentence or both. A general authorization for the release of medical or other 
information is NOT sufficient authorization for further disclosure.



Insurance Providers







          Payer name Policy type Policy ID Covered   Covered party's Policy    P

lenny



                    / Coverage           party ID  relationship to Mccrary    Inf

ormation



                    type                          mccrary              

 

          [facility] [id number] -           [id number]                             [id number]

[id number]



          [facility]

## 2021-07-02 ENCOUNTER — HOSPITAL ENCOUNTER (EMERGENCY)
Dept: HOSPITAL 74 - JER | Age: 46
Discharge: HOME | End: 2021-07-02
Payer: COMMERCIAL

## 2021-07-02 VITALS — TEMPERATURE: 99 F | SYSTOLIC BLOOD PRESSURE: 133 MMHG | HEART RATE: 108 BPM | DIASTOLIC BLOOD PRESSURE: 90 MMHG

## 2021-07-02 VITALS — BODY MASS INDEX: 29.1 KG/M2

## 2021-07-02 DIAGNOSIS — R05: Primary | ICD-10-CM

## 2021-07-02 PROCEDURE — U0005 INFEC AGEN DETEC AMPLI PROBE: HCPCS

## 2021-07-02 PROCEDURE — 3E0F7GC INTRODUCTION OF OTHER THERAPEUTIC SUBSTANCE INTO RESPIRATORY TRACT, VIA NATURAL OR ARTIFICIAL OPENING: ICD-10-PCS

## 2021-07-02 PROCEDURE — C9803 HOPD COVID-19 SPEC COLLECT: HCPCS

## 2021-07-02 PROCEDURE — U0003 INFECTIOUS AGENT DETECTION BY NUCLEIC ACID (DNA OR RNA); SEVERE ACUTE RESPIRATORY SYNDROME CORONAVIRUS 2 (SARS-COV-2) (CORONAVIRUS DISEASE [COVID-19]), AMPLIFIED PROBE TECHNIQUE, MAKING USE OF HIGH THROUGHPUT TECHNOLOGIES AS DESCRIBED BY CMS-2020-01-R: HCPCS

## 2021-09-15 ENCOUNTER — HOSPITAL ENCOUNTER (EMERGENCY)
Dept: HOSPITAL 74 - JER | Age: 46
Discharge: HOME | End: 2021-09-15
Payer: COMMERCIAL

## 2021-09-15 VITALS — BODY MASS INDEX: 27.4 KG/M2

## 2021-09-15 VITALS — TEMPERATURE: 97.8 F | HEART RATE: 93 BPM | SYSTOLIC BLOOD PRESSURE: 164 MMHG | DIASTOLIC BLOOD PRESSURE: 98 MMHG

## 2021-09-15 DIAGNOSIS — T50.Z95A: Primary | ICD-10-CM

## 2021-09-15 LAB
ALBUMIN SERPL-MCNC: 4.1 G/DL (ref 3.4–5)
ALP SERPL-CCNC: 81 U/L (ref 45–117)
ALT SERPL-CCNC: 23 U/L (ref 13–61)
ANION GAP SERPL CALC-SCNC: 6 MMOL/L (ref 8–16)
AST SERPL-CCNC: 26 U/L (ref 15–37)
BASOPHILS # BLD: 0.6 % (ref 0–2)
BILIRUB SERPL-MCNC: 0.5 MG/DL (ref 0.2–1)
BUN SERPL-MCNC: 11.7 MG/DL (ref 7–18)
CALCIUM SERPL-MCNC: 9.4 MG/DL (ref 8.5–10.1)
CHLORIDE SERPL-SCNC: 104 MMOL/L (ref 98–107)
CO2 SERPL-SCNC: 28 MMOL/L (ref 21–32)
CREAT SERPL-MCNC: 0.7 MG/DL (ref 0.55–1.3)
DEPRECATED RDW RBC AUTO: 13.2 % (ref 11.6–15.6)
EOSINOPHIL # BLD: 1.5 % (ref 0–4.5)
GLUCOSE SERPL-MCNC: 88 MG/DL (ref 74–106)
HCT VFR BLD CALC: 44.9 % (ref 32.4–45.2)
HGB BLD-MCNC: 15.6 GM/DL (ref 10.7–15.3)
LYMPHOCYTES # BLD: 23.8 % (ref 8–40)
MCH RBC QN AUTO: 32.7 PG (ref 25.7–33.7)
MCHC RBC AUTO-ENTMCNC: 34.7 G/DL (ref 32–36)
MCV RBC: 94.2 FL (ref 80–96)
MONOCYTES # BLD AUTO: 5.6 % (ref 3.8–10.2)
NEUTROPHILS # BLD: 68.5 % (ref 42.8–82.8)
PLATELET # BLD AUTO: 287 10^3/UL (ref 134–434)
PMV BLD: 8.1 FL (ref 7.5–11.1)
PROT SERPL-MCNC: 7.4 G/DL (ref 6.4–8.2)
RBC # BLD AUTO: 4.77 M/MM3 (ref 3.6–5.2)
SODIUM SERPL-SCNC: 138 MMOL/L (ref 136–145)
WBC # BLD AUTO: 9 K/MM3 (ref 4–10)

## 2021-09-15 PROCEDURE — 3E033GC INTRODUCTION OF OTHER THERAPEUTIC SUBSTANCE INTO PERIPHERAL VEIN, PERCUTANEOUS APPROACH: ICD-10-PCS

## 2022-08-29 ENCOUNTER — HOSPITAL ENCOUNTER (EMERGENCY)
Dept: HOSPITAL 74 - JER | Age: 47
Discharge: HOME | End: 2022-08-29
Payer: COMMERCIAL

## 2022-08-29 VITALS
RESPIRATION RATE: 18 BRPM | TEMPERATURE: 98.1 F | SYSTOLIC BLOOD PRESSURE: 151 MMHG | HEART RATE: 113 BPM | DIASTOLIC BLOOD PRESSURE: 94 MMHG

## 2022-08-29 VITALS — BODY MASS INDEX: 27.4 KG/M2

## 2022-08-29 DIAGNOSIS — M25.531: Primary | ICD-10-CM

## 2022-12-07 ENCOUNTER — HOSPITAL ENCOUNTER (OUTPATIENT)
Dept: HOSPITAL 74 - JER | Age: 47
Setting detail: OBSERVATION
LOS: 1 days | Discharge: HOME | End: 2022-12-08
Attending: FAMILY MEDICINE | Admitting: FAMILY MEDICINE
Payer: COMMERCIAL

## 2022-12-07 VITALS — BODY MASS INDEX: 31.9 KG/M2

## 2022-12-07 DIAGNOSIS — F17.210: ICD-10-CM

## 2022-12-07 DIAGNOSIS — E66.8: ICD-10-CM

## 2022-12-07 DIAGNOSIS — I10: ICD-10-CM

## 2022-12-07 DIAGNOSIS — I24.9: Primary | ICD-10-CM

## 2022-12-07 DIAGNOSIS — Z88.8: ICD-10-CM

## 2022-12-07 DIAGNOSIS — R06.09: ICD-10-CM

## 2022-12-07 LAB
ALBUMIN SERPL-MCNC: 3.8 G/DL (ref 3.4–5)
ALP SERPL-CCNC: 75 U/L (ref 45–117)
ALT SERPL-CCNC: 18 U/L (ref 13–61)
ANION GAP SERPL CALC-SCNC: 10 MMOL/L (ref 8–16)
AST SERPL-CCNC: 20 U/L (ref 15–37)
BASOPHILS # BLD: 0.4 % (ref 0–2)
BILIRUB SERPL-MCNC: 0.4 MG/DL (ref 0.2–1)
BNP SERPL-MCNC: 93.9 PG/ML (ref 5–125)
BUN SERPL-MCNC: 13.5 MG/DL (ref 7–18)
CALCIUM SERPL-MCNC: 9.2 MG/DL (ref 8.5–10.1)
CHLORIDE SERPL-SCNC: 107 MMOL/L (ref 98–107)
CO2 SERPL-SCNC: 25 MMOL/L (ref 21–32)
CREAT SERPL-MCNC: 0.7 MG/DL (ref 0.55–1.3)
DEPRECATED RDW RBC AUTO: 13.1 % (ref 11.6–15.6)
EOSINOPHIL # BLD: 1.2 % (ref 0–4.5)
GLUCOSE SERPL-MCNC: 124 MG/DL (ref 74–106)
HCT VFR BLD CALC: 45 % (ref 32.4–45.2)
HGB BLD-MCNC: 15 GM/DL (ref 10.7–15.3)
LYMPHOCYTES # BLD: 14.1 % (ref 8–40)
MCH RBC QN AUTO: 31.5 PG (ref 25.7–33.7)
MCHC RBC AUTO-ENTMCNC: 33.4 G/DL (ref 32–36)
MCV RBC: 94.2 FL (ref 80–96)
MONOCYTES # BLD AUTO: 5.2 % (ref 3.8–10.2)
NEUTROPHILS # BLD: 79.1 % (ref 42.8–82.8)
PLATELET # BLD AUTO: 310 10^3/UL (ref 134–434)
PMV BLD: 8.3 FL (ref 7.5–11.1)
PROT SERPL-MCNC: 6.7 G/DL (ref 6.4–8.2)
RBC # BLD AUTO: 4.77 M/MM3 (ref 3.6–5.2)
SODIUM SERPL-SCNC: 142 MMOL/L (ref 136–145)
WBC # BLD AUTO: 9.8 K/MM3 (ref 4–10)

## 2022-12-07 PROCEDURE — G0378 HOSPITAL OBSERVATION PER HR: HCPCS

## 2022-12-07 RX ADMIN — PANTOPRAZOLE SODIUM SCH MG: 40 TABLET, DELAYED RELEASE ORAL at 14:59

## 2022-12-08 VITALS
RESPIRATION RATE: 18 BRPM | DIASTOLIC BLOOD PRESSURE: 80 MMHG | HEART RATE: 77 BPM | TEMPERATURE: 97.5 F | SYSTOLIC BLOOD PRESSURE: 127 MMHG

## 2022-12-08 LAB
ALBUMIN SERPL-MCNC: 3.3 G/DL (ref 3.4–5)
ALP SERPL-CCNC: 65 U/L (ref 45–117)
ALT SERPL-CCNC: 16 U/L (ref 13–61)
ANION GAP SERPL CALC-SCNC: 8 MMOL/L (ref 8–16)
AST SERPL-CCNC: 18 U/L (ref 15–37)
BILIRUB SERPL-MCNC: 0.7 MG/DL (ref 0.2–1)
BUN SERPL-MCNC: 9.4 MG/DL (ref 7–18)
CALCIUM SERPL-MCNC: 8.6 MG/DL (ref 8.5–10.1)
CHLORIDE SERPL-SCNC: 107 MMOL/L (ref 98–107)
CHOLEST SERPL-MCNC: 167 MG/DL (ref 50–200)
CO2 SERPL-SCNC: 26 MMOL/L (ref 21–32)
CREAT SERPL-MCNC: 0.5 MG/DL (ref 0.55–1.3)
DEPRECATED RDW RBC AUTO: 12.8 % (ref 11.6–15.6)
GLUCOSE SERPL-MCNC: 91 MG/DL (ref 74–106)
HCT VFR BLD CALC: 40.4 % (ref 32.4–45.2)
HDLC SERPL-MCNC: 67 MG/DL (ref 40–60)
HGB BLD-MCNC: 14 GM/DL (ref 10.7–15.3)
IRON SERPL-MCNC: 97 UG/DL (ref 50–175)
LDLC SERPL CALC-MCNC: 88 MG/DL (ref 5–100)
MCH RBC QN AUTO: 32.4 PG (ref 25.7–33.7)
MCHC RBC AUTO-ENTMCNC: 34.6 G/DL (ref 32–36)
MCV RBC: 93.5 FL (ref 80–96)
PLATELET # BLD AUTO: 264 10^3/UL (ref 134–434)
PMV BLD: 8.9 FL (ref 7.5–11.1)
PROT SERPL-MCNC: 6 G/DL (ref 6.4–8.2)
RBC # BLD AUTO: 4.32 M/MM3 (ref 3.6–5.2)
SODIUM SERPL-SCNC: 141 MMOL/L (ref 136–145)
TIBC SERPL-MCNC: 315 UG/DL (ref 250–450)
TRIGL SERPL-MCNC: 80 MG/DL (ref 0–150)
WBC # BLD AUTO: 6.7 K/MM3 (ref 4–10)

## 2022-12-08 RX ADMIN — PANTOPRAZOLE SODIUM SCH MG: 40 TABLET, DELAYED RELEASE ORAL at 09:21

## 2023-05-03 ENCOUNTER — HOSPITAL ENCOUNTER (EMERGENCY)
Dept: HOSPITAL 74 - JER | Age: 48
Discharge: HOME | End: 2023-05-03
Payer: COMMERCIAL

## 2023-05-03 VITALS — BODY MASS INDEX: 28.3 KG/M2

## 2023-05-03 VITALS
SYSTOLIC BLOOD PRESSURE: 159 MMHG | RESPIRATION RATE: 18 BRPM | HEART RATE: 95 BPM | TEMPERATURE: 97.9 F | DIASTOLIC BLOOD PRESSURE: 100 MMHG

## 2023-05-03 DIAGNOSIS — R51.9: ICD-10-CM

## 2023-05-03 DIAGNOSIS — M54.31: ICD-10-CM

## 2023-05-03 DIAGNOSIS — M79.604: Primary | ICD-10-CM

## 2023-05-03 DIAGNOSIS — M79.601: ICD-10-CM

## 2023-05-03 DIAGNOSIS — R20.2: ICD-10-CM

## 2023-05-03 DIAGNOSIS — M54.2: ICD-10-CM

## 2023-05-03 DIAGNOSIS — M25.551: ICD-10-CM

## 2023-05-03 LAB
ALBUMIN SERPL-MCNC: 4.1 G/DL (ref 3.4–5)
ALP SERPL-CCNC: 77 U/L (ref 45–117)
ALT SERPL-CCNC: 14 U/L (ref 13–61)
ANION GAP SERPL CALC-SCNC: 4 MMOL/L (ref 8–16)
AST SERPL-CCNC: 12 U/L (ref 15–37)
BASOPHILS # BLD: 0.8 % (ref 0–2)
BILIRUB SERPL-MCNC: 0.4 MG/DL (ref 0.2–1)
BUN SERPL-MCNC: 14.8 MG/DL (ref 7–18)
CALCIUM SERPL-MCNC: 9.3 MG/DL (ref 8.5–10.1)
CHLORIDE SERPL-SCNC: 106 MMOL/L (ref 98–107)
CO2 SERPL-SCNC: 28 MMOL/L (ref 21–32)
CREAT SERPL-MCNC: 0.7 MG/DL (ref 0.55–1.3)
DEPRECATED RDW RBC AUTO: 13.1 % (ref 11.6–15.6)
EOSINOPHIL # BLD: 1.6 % (ref 0–4.5)
GLUCOSE SERPL-MCNC: 106 MG/DL (ref 74–106)
HCT VFR BLD CALC: 42.2 % (ref 32.4–45.2)
HGB BLD-MCNC: 14.7 GM/DL (ref 10.7–15.3)
LYMPHOCYTES # BLD: 28.1 % (ref 8–40)
MCH RBC QN AUTO: 32.6 PG (ref 25.7–33.7)
MCHC RBC AUTO-ENTMCNC: 34.8 G/DL (ref 32–36)
MCV RBC: 93.7 FL (ref 80–96)
MONOCYTES # BLD AUTO: 7.5 % (ref 3.8–10.2)
NEUTROPHILS # BLD: 62 % (ref 42.8–82.8)
PLATELET # BLD AUTO: 296 10^3/UL (ref 134–434)
PMV BLD: 7.8 FL (ref 7.5–11.1)
POTASSIUM SERPLBLD-SCNC: 3.9 MMOL/L (ref 3.5–5.1)
PROT SERPL-MCNC: 7.1 G/DL (ref 6.4–8.2)
RBC # BLD AUTO: 4.5 M/MM3 (ref 3.6–5.2)
SODIUM SERPL-SCNC: 138 MMOL/L (ref 136–145)
WBC # BLD AUTO: 8.9 K/MM3 (ref 4–10)